# Patient Record
Sex: FEMALE | Race: ASIAN | NOT HISPANIC OR LATINO
[De-identification: names, ages, dates, MRNs, and addresses within clinical notes are randomized per-mention and may not be internally consistent; named-entity substitution may affect disease eponyms.]

---

## 2023-07-11 PROBLEM — Z00.00 ENCOUNTER FOR PREVENTIVE HEALTH EXAMINATION: Status: ACTIVE | Noted: 2023-07-11

## 2023-07-12 ENCOUNTER — NON-APPOINTMENT (OUTPATIENT)
Age: 53
End: 2023-07-12

## 2023-07-12 ENCOUNTER — APPOINTMENT (OUTPATIENT)
Dept: OTOLARYNGOLOGY | Facility: CLINIC | Age: 53
End: 2023-07-12
Payer: COMMERCIAL

## 2023-07-12 VITALS — BODY MASS INDEX: 21.71 KG/M2 | HEIGHT: 61 IN | WEIGHT: 115 LBS

## 2023-07-12 DIAGNOSIS — H81.09 MENIERE'S DISEASE, UNSPECIFIED EAR: ICD-10-CM

## 2023-07-12 DIAGNOSIS — H61.23 IMPACTED CERUMEN, BILATERAL: ICD-10-CM

## 2023-07-12 PROCEDURE — 69210 REMOVE IMPACTED EAR WAX UNI: CPT

## 2023-07-12 PROCEDURE — 92567 TYMPANOMETRY: CPT

## 2023-07-12 PROCEDURE — 92557 COMPREHENSIVE HEARING TEST: CPT

## 2023-07-12 PROCEDURE — 99204 OFFICE O/P NEW MOD 45 MIN: CPT | Mod: 25

## 2023-07-12 NOTE — ASSESSMENT
[FreeTextEntry1] : Nonspecific symptoms including disequilibrium and generalized dizziness.  Vestibular hypofunction suspected.  Other etiologies are possible.  No strong evidence for active Ménière's disease despite a history of this diagnosis.\par \par I have recommended an eye exam.  She  states that her vision has not been checked for several years.\par \par Low-sodium diet suggested.  I have recommended considering a neurology evaluation.  \par \par Follow-up with repeat audiometry in 3 months recommended.

## 2023-07-12 NOTE — HISTORY OF PRESENT ILLNESS
[de-identified] : YANDEL RUVALCABA has a history of bilateral Menieres disease diagnosed in 2012. \par Known  history of hearing loss in the right > left ear.  Bilateral intermittent tinnitus reported.  [FreeTextEntry1] : 07/12/2023\par Recently noted imbalance and listing.  Clumsiness. Recent intermittent gentle swaying and mild nausea reported.  No vertigo.

## 2023-07-12 NOTE — PHYSICAL EXAM
resolved with cataract surgery. [Midline] : trachea located in midline position [Normal] : no rashes [FreeTextEntry1] : Microscopic ear exam with cerumen debridement:\par \par Right ear: Obstructing cerumen was debrided from the ear canal using suction, and curet.  The ear canal was otherwise within normal limits.  The tympanic membrane was intact and noninflamed.\par \par Left ear: Obstructing cerumen was debrided from the ear canal using suction, and curets.  The ear canal was otherwise within normal limits.  The tympanic membrane was intact and noninflamed.

## 2023-07-12 NOTE — DATA REVIEWED
[de-identified] : After debridement, in light of the patients current symptoms, Complete audiometry was ordered and completed today. I have interpreted these results and reviewed them in detail with the patient.\par Mild to moderate sensorineural hearing loss in the left ear.  Tympanometry normal bilaterally

## 2023-10-16 ENCOUNTER — APPOINTMENT (OUTPATIENT)
Dept: OTOLARYNGOLOGY | Facility: CLINIC | Age: 53
End: 2023-10-16
Payer: COMMERCIAL

## 2023-10-16 DIAGNOSIS — Z78.9 OTHER SPECIFIED HEALTH STATUS: ICD-10-CM

## 2023-10-16 DIAGNOSIS — H90.42 SENSORINEURAL HEARING LOSS, UNILATERAL, LEFT EAR, WITH UNRESTRICTED HEARING ON THE CONTRALATERAL SIDE: ICD-10-CM

## 2023-10-16 DIAGNOSIS — Z87.891 PERSONAL HISTORY OF NICOTINE DEPENDENCE: ICD-10-CM

## 2023-10-16 DIAGNOSIS — Z80.6 FAMILY HISTORY OF LEUKEMIA: ICD-10-CM

## 2023-10-16 DIAGNOSIS — Z82.49 FAMILY HISTORY OF ISCHEMIC HEART DISEASE AND OTHER DISEASES OF THE CIRCULATORY SYSTEM: ICD-10-CM

## 2023-10-16 DIAGNOSIS — Z80.9 FAMILY HISTORY OF MALIGNANT NEOPLASM, UNSPECIFIED: ICD-10-CM

## 2023-10-16 PROCEDURE — 99213 OFFICE O/P EST LOW 20 MIN: CPT

## 2023-10-16 PROCEDURE — 92567 TYMPANOMETRY: CPT

## 2023-10-16 PROCEDURE — 92557 COMPREHENSIVE HEARING TEST: CPT

## 2023-10-16 PROCEDURE — 92504 EAR MICROSCOPY EXAMINATION: CPT

## 2023-10-19 PROBLEM — H90.42 SENSORINEURAL HEARING LOSS (SNHL) OF LEFT EAR WITH UNRESTRICTED HEARING OF RIGHT EAR: Status: ACTIVE | Noted: 2023-07-20

## 2023-10-30 ENCOUNTER — TRANSCRIPTION ENCOUNTER (OUTPATIENT)
Age: 53
End: 2023-10-30